# Patient Record
Sex: FEMALE | Race: BLACK OR AFRICAN AMERICAN | NOT HISPANIC OR LATINO
[De-identification: names, ages, dates, MRNs, and addresses within clinical notes are randomized per-mention and may not be internally consistent; named-entity substitution may affect disease eponyms.]

---

## 2023-06-28 PROBLEM — Z00.00 ENCOUNTER FOR PREVENTIVE HEALTH EXAMINATION: Status: ACTIVE | Noted: 2023-06-28

## 2023-07-26 ENCOUNTER — APPOINTMENT (OUTPATIENT)
Dept: PULMONOLOGY | Facility: CLINIC | Age: 28
End: 2023-07-26
Payer: MEDICAID

## 2023-07-26 VITALS
BODY MASS INDEX: 36.52 KG/M2 | HEART RATE: 108 BPM | SYSTOLIC BLOOD PRESSURE: 115 MMHG | WEIGHT: 186 LBS | OXYGEN SATURATION: 97 % | DIASTOLIC BLOOD PRESSURE: 79 MMHG | HEIGHT: 60 IN

## 2023-07-26 DIAGNOSIS — I10 ESSENTIAL (PRIMARY) HYPERTENSION: ICD-10-CM

## 2023-07-26 DIAGNOSIS — Z83.3 FAMILY HISTORY OF DIABETES MELLITUS: ICD-10-CM

## 2023-07-26 DIAGNOSIS — E66.9 OBESITY, UNSPECIFIED: ICD-10-CM

## 2023-07-26 DIAGNOSIS — Z78.9 OTHER SPECIFIED HEALTH STATUS: ICD-10-CM

## 2023-07-26 DIAGNOSIS — Z86.39 PERSONAL HISTORY OF OTHER ENDOCRINE, NUTRITIONAL AND METABOLIC DISEASE: ICD-10-CM

## 2023-07-26 DIAGNOSIS — E11.9 TYPE 2 DIABETES MELLITUS W/OUT COMPLICATIONS: ICD-10-CM

## 2023-07-26 DIAGNOSIS — Z82.3 FAMILY HISTORY OF STROKE: ICD-10-CM

## 2023-07-26 DIAGNOSIS — R06.02 SHORTNESS OF BREATH: ICD-10-CM

## 2023-07-26 DIAGNOSIS — Z01.811 ENCOUNTER FOR PREPROCEDURAL RESPIRATORY EXAMINATION: ICD-10-CM

## 2023-07-26 DIAGNOSIS — R06.83 SNORING: ICD-10-CM

## 2023-07-26 PROCEDURE — 99203 OFFICE O/P NEW LOW 30 MIN: CPT

## 2023-07-26 NOTE — PHYSICAL EXAM
MEDICATIONS  (STANDING):  aspirin  chewable 81 milliGRAM(s) Oral daily  famotidine    Tablet 20 milliGRAM(s) Oral daily  fluticasone furoate/umeclidinium/vilanterol 100-62.5-25 MICROgram(s) Inhaler 1 Puff(s) Inhalation daily  latanoprost 0.005% Ophthalmic Solution 1 Drop(s) Both EYES at bedtime  metoprolol succinate ER 25 milliGRAM(s) Oral daily  tamsulosin 0.4 milliGRAM(s) Oral at bedtime    MEDICATIONS  (PRN):  acetaminophen     Tablet .. 650 milliGRAM(s) Oral every 6 hours PRN Temp greater or equal to 38C (100.4F), Mild Pain (1 - 3)  albuterol    90 MICROgram(s) HFA Inhaler 2 Puff(s) Inhalation every 6 hours PRN Shortness of Breath and/or Wheezing  ondansetron Injectable 4 milliGRAM(s) IV Push every 6 hours PRN Nausea  oxyCODONE    IR 5 milliGRAM(s) Oral every 6 hours PRN Severe Pain (7 - 10)   [No Acute Distress] : no acute distress MEDICATIONS  (STANDING):  aspirin  chewable 81 milliGRAM(s) Oral daily  famotidine    Tablet 20 milliGRAM(s) Oral daily  fluticasone furoate/umeclidinium/vilanterol 100-62.5-25 MICROgram(s) Inhaler 1 Puff(s) Inhalation daily  latanoprost 0.005% Ophthalmic Solution 1 Drop(s) Both EYES at bedtime  metoprolol succinate ER 25 milliGRAM(s) Oral daily  tamsulosin 0.4 milliGRAM(s) Oral at bedtime    MEDICATIONS  (PRN):  acetaminophen     Tablet .. 650 milliGRAM(s) Oral every 6 hours PRN Temp greater or equal to 38C (100.4F), Mild Pain (1 - 3)  albuterol    90 MICROgram(s) HFA Inhaler 2 Puff(s) Inhalation every 6 hours PRN Shortness of Breath and/or Wheezing  ondansetron Injectable 4 milliGRAM(s) IV Push every 6 hours PRN Nausea  oxyCODONE    IR 5 milliGRAM(s) Oral every 6 hours PRN Severe Pain (7 - 10) [Normal Oropharynx] : normal oropharynx [Normal Appearance] : normal appearance [No Neck Mass] : no neck mass [Normal Rate/Rhythm] : normal rate/rhythm [Normal S1, S2] : normal s1, s2 [No Murmurs] : no murmurs [No Resp Distress] : no resp distress [Clear to Auscultation Bilaterally] : clear to auscultation bilaterally [No Abnormalities] : no abnormalities [Benign] : benign [Normal Gait] : normal gait [No Clubbing] : no clubbing [No Cyanosis] : no cyanosis [No Edema] : no edema [FROM] : FROM [Normal Color/ Pigmentation] : normal color/ pigmentation [No Focal Deficits] : no focal deficits [Oriented x3] : oriented x3 [Normal Affect] : normal affect

## 2023-07-26 NOTE — ASSESSMENT
[FreeTextEntry1] : #SOB\par #Snoring\par #preop pulmonary\par \par SOB could be multifocal due to weight gain, deconditioning, untreated JONELLE, cardiac, pulmonary but not limited too.  Will further evaluate with PFT and home sleep study.  \par \par I discussed the short and long term health effect of the obstructive seep apnea with the patient. These effects include, but not limited to, uncontrolled hypertension, CAD, arrhythmias, sudden death, CVA, and pulmonary hypertension. I advised the patient to avoid sedatives, narcotics, driving, and sleeping pills in the meantime. I discussed the therapeutic options including but not limited to CPAP, surgery, and oral appliance. Further recommendations will follow after sleep study.\par \par MADDIE ROSE  is optimized for surgery. she  is to be extubated once fully awake and able to protect airway.  The patient is to be monitored in the recovery room. They might benefit for high flow oxygen or noninvasive ventilation to prevent or reverse atelectasis.  Patient is to be admitted to a monitored bed postoperatively.  Avoid oversedation.  MADDIE is high risk for DVT and will require bimodal agents for DVT prophylaxis early mobilization is recommended. she is to use the incentive spirometry postoperative. \par \par Follow on home sleep study and PFT.\par \par \par

## 2023-07-26 NOTE — END OF VISIT
[Time Spent: ___ minutes] : I have spent [unfilled] minutes of time on the encounter. [>50% of the face to face encounter time was spent on counseling and/or coordination of care for ___] : Greater than 50% of the face to face encounter time was spent on counseling and/or coordination of care for [unfilled] [FreeTextEntry3] : Pt seen with DAVID Costa and and agree on the above plan of care.  follow on PFT and sleep study No indication fr CXR

## 2023-07-26 NOTE — HISTORY OF PRESENT ILLNESS
[Awakes Unrefreshed] : awakes unrefreshed [Awakes with Dry Mouth] : awakes with dry mouth [Daytime Somnolence] : daytime somnolence [Difficulty Initiating Sleep] : difficulty initiating sleep [Difficulty Maintaining Sleep] : difficulty maintaining sleep [Fatigue] : fatigue [Frequent Nocturnal Awakening] : frequent nocturnal awakening [Nonrestorative Sleep] : nonrestorative sleep [Recent  Weight Gain] : recent weight gain [Snoring] : snoring [Never] : never [TextBox_4] : 27 yr old with PMH PCOS, DM, HTN. Presents today pulmonary clearance.\par \par She present for for bariatric clearance for Dr. Ko Cohen.  She is snoring at night, breathing through her mouth, waking up SOB and has a lot of GERD.  She is has trouble sleeping.  She has have her period for a month heavy bleeding.  Her last HGA1C 10. She is able to walk 3 - 4 miles without SOB.  She has coughing and  has GERD.   Denies wheezing or asthma in the family.  [Awakes with Headache] : does not awaken with headache [ESS] : 10

## 2023-07-27 ENCOUNTER — APPOINTMENT (OUTPATIENT)
Dept: PULMONOLOGY | Facility: CLINIC | Age: 28
End: 2023-07-27
Payer: MEDICAID

## 2023-07-27 PROCEDURE — 94729 DIFFUSING CAPACITY: CPT

## 2023-07-27 PROCEDURE — 94727 GAS DIL/WSHOT DETER LNG VOL: CPT

## 2023-07-27 PROCEDURE — 94060 EVALUATION OF WHEEZING: CPT

## 2023-08-01 ENCOUNTER — EMERGENCY (EMERGENCY)
Facility: HOSPITAL | Age: 28
LOS: 1 days | Discharge: LEFT BEFORE DISCHARGE | End: 2023-08-01
Attending: STUDENT IN AN ORGANIZED HEALTH CARE EDUCATION/TRAINING PROGRAM | Admitting: EMERGENCY MEDICINE
Payer: MEDICAID

## 2023-08-01 VITALS
OXYGEN SATURATION: 98 % | SYSTOLIC BLOOD PRESSURE: 124 MMHG | HEART RATE: 89 BPM | DIASTOLIC BLOOD PRESSURE: 82 MMHG | TEMPERATURE: 98 F | RESPIRATION RATE: 18 BRPM

## 2023-08-01 LAB
ALBUMIN SERPL ELPH-MCNC: 4.5 G/DL — SIGNIFICANT CHANGE UP (ref 3.3–5)
ALBUMIN SERPL ELPH-MCNC: 5 G/DL — SIGNIFICANT CHANGE UP (ref 3.3–5)
ALP SERPL-CCNC: 109 U/L — SIGNIFICANT CHANGE UP (ref 40–120)
ALP SERPL-CCNC: 91 U/L — SIGNIFICANT CHANGE UP (ref 40–120)
ALT FLD-CCNC: 64 U/L — HIGH (ref 4–33)
ALT FLD-CCNC: 77 U/L — HIGH (ref 4–33)
ANION GAP SERPL CALC-SCNC: 14 MMOL/L — SIGNIFICANT CHANGE UP (ref 7–14)
ANION GAP SERPL CALC-SCNC: 16 MMOL/L — HIGH (ref 7–14)
ANION GAP SERPL CALC-SCNC: 17 MMOL/L — HIGH (ref 7–14)
ANION GAP SERPL CALC-SCNC: 20 MMOL/L — HIGH (ref 7–14)
APPEARANCE UR: ABNORMAL
APTT BLD: 23.9 SEC — LOW (ref 24.5–35.6)
AST SERPL-CCNC: 79 U/L — HIGH (ref 4–32)
AST SERPL-CCNC: 83 U/L — HIGH (ref 4–32)
B-OH-BUTYR SERPL-SCNC: 2.9 MMOL/L — HIGH (ref 0–0.4)
B-OH-BUTYR SERPL-SCNC: 3.7 MMOL/L — HIGH (ref 0–0.4)
BACTERIA # UR AUTO: ABNORMAL /HPF
BASE EXCESS BLDV CALC-SCNC: -10.2 MMOL/L — LOW (ref -2–3)
BASE EXCESS BLDV CALC-SCNC: -8.2 MMOL/L — LOW (ref -2–3)
BASE EXCESS BLDV CALC-SCNC: -8.7 MMOL/L — LOW (ref -2–3)
BASOPHILS # BLD AUTO: 0.07 K/UL — SIGNIFICANT CHANGE UP (ref 0–0.2)
BASOPHILS NFR BLD AUTO: 0.8 % — SIGNIFICANT CHANGE UP (ref 0–2)
BILIRUB SERPL-MCNC: 0.3 MG/DL — SIGNIFICANT CHANGE UP (ref 0.2–1.2)
BILIRUB SERPL-MCNC: 0.5 MG/DL — SIGNIFICANT CHANGE UP (ref 0.2–1.2)
BILIRUB UR-MCNC: NEGATIVE — SIGNIFICANT CHANGE UP
BLD GP AB SCN SERPL QL: NEGATIVE — SIGNIFICANT CHANGE UP
BLOOD GAS VENOUS COMPREHENSIVE RESULT: SIGNIFICANT CHANGE UP
BUN SERPL-MCNC: 5 MG/DL — LOW (ref 7–23)
BUN SERPL-MCNC: 5 MG/DL — LOW (ref 7–23)
BUN SERPL-MCNC: 6 MG/DL — LOW (ref 7–23)
BUN SERPL-MCNC: 7 MG/DL — SIGNIFICANT CHANGE UP (ref 7–23)
CALCIUM SERPL-MCNC: 7.9 MG/DL — LOW (ref 8.4–10.5)
CALCIUM SERPL-MCNC: 8.8 MG/DL — SIGNIFICANT CHANGE UP (ref 8.4–10.5)
CALCIUM SERPL-MCNC: 8.9 MG/DL — SIGNIFICANT CHANGE UP (ref 8.4–10.5)
CALCIUM SERPL-MCNC: 9.8 MG/DL — SIGNIFICANT CHANGE UP (ref 8.4–10.5)
CAST: 0 /LPF — SIGNIFICANT CHANGE UP (ref 0–4)
CHLORIDE BLDV-SCNC: 101 MMOL/L — SIGNIFICANT CHANGE UP (ref 96–108)
CHLORIDE BLDV-SCNC: 102 MMOL/L — SIGNIFICANT CHANGE UP (ref 96–108)
CHLORIDE BLDV-SCNC: 104 MMOL/L — SIGNIFICANT CHANGE UP (ref 96–108)
CHLORIDE SERPL-SCNC: 101 MMOL/L — SIGNIFICANT CHANGE UP (ref 98–107)
CHLORIDE SERPL-SCNC: 101 MMOL/L — SIGNIFICANT CHANGE UP (ref 98–107)
CHLORIDE SERPL-SCNC: 105 MMOL/L — SIGNIFICANT CHANGE UP (ref 98–107)
CHLORIDE SERPL-SCNC: 96 MMOL/L — LOW (ref 98–107)
CO2 BLDV-SCNC: 19 MMOL/L — LOW (ref 22–26)
CO2 BLDV-SCNC: 19.7 MMOL/L — LOW (ref 22–26)
CO2 BLDV-SCNC: 20.7 MMOL/L — LOW (ref 22–26)
CO2 SERPL-SCNC: 16 MMOL/L — LOW (ref 22–31)
CO2 SERPL-SCNC: 17 MMOL/L — LOW (ref 22–31)
CO2 SERPL-SCNC: 17 MMOL/L — LOW (ref 22–31)
CO2 SERPL-SCNC: 18 MMOL/L — LOW (ref 22–31)
COLOR SPEC: ABNORMAL
CREAT SERPL-MCNC: 0.64 MG/DL — SIGNIFICANT CHANGE UP (ref 0.5–1.3)
CREAT SERPL-MCNC: 0.68 MG/DL — SIGNIFICANT CHANGE UP (ref 0.5–1.3)
CREAT SERPL-MCNC: 0.73 MG/DL — SIGNIFICANT CHANGE UP (ref 0.5–1.3)
CREAT SERPL-MCNC: 0.82 MG/DL — SIGNIFICANT CHANGE UP (ref 0.5–1.3)
DIFF PNL FLD: ABNORMAL
EGFR: 102 ML/MIN/1.73M2 — SIGNIFICANT CHANGE UP
EGFR: 117 ML/MIN/1.73M2 — SIGNIFICANT CHANGE UP
EGFR: 124 ML/MIN/1.73M2 — SIGNIFICANT CHANGE UP
EGFR: 126 ML/MIN/1.73M2 — SIGNIFICANT CHANGE UP
EOSINOPHIL # BLD AUTO: 0.11 K/UL — SIGNIFICANT CHANGE UP (ref 0–0.5)
EOSINOPHIL NFR BLD AUTO: 1.3 % — SIGNIFICANT CHANGE UP (ref 0–6)
GAS PNL BLDV: 133 MMOL/L — LOW (ref 136–145)
GAS PNL BLDV: 134 MMOL/L — LOW (ref 136–145)
GAS PNL BLDV: 134 MMOL/L — LOW (ref 136–145)
GAS PNL BLDV: SIGNIFICANT CHANGE UP
GLUCOSE BLDV-MCNC: 210 MG/DL — HIGH (ref 70–99)
GLUCOSE BLDV-MCNC: 275 MG/DL — HIGH (ref 70–99)
GLUCOSE BLDV-MCNC: 281 MG/DL — HIGH (ref 70–99)
GLUCOSE SERPL-MCNC: 201 MG/DL — HIGH (ref 70–99)
GLUCOSE SERPL-MCNC: 263 MG/DL — HIGH (ref 70–99)
GLUCOSE SERPL-MCNC: 264 MG/DL — HIGH (ref 70–99)
GLUCOSE SERPL-MCNC: 359 MG/DL — HIGH (ref 70–99)
GLUCOSE UR QL: >=1000 MG/DL
HCO3 BLDV-SCNC: 18 MMOL/L — LOW (ref 22–29)
HCO3 BLDV-SCNC: 18 MMOL/L — LOW (ref 22–29)
HCO3 BLDV-SCNC: 19 MMOL/L — LOW (ref 22–29)
HCT VFR BLD CALC: 45.7 % — HIGH (ref 34.5–45)
HCT VFR BLDA CALC: 40 % — SIGNIFICANT CHANGE UP (ref 34.5–46.5)
HCT VFR BLDA CALC: 42 % — SIGNIFICANT CHANGE UP (ref 34.5–46.5)
HCT VFR BLDA CALC: 42 % — SIGNIFICANT CHANGE UP (ref 34.5–46.5)
HGB BLD CALC-MCNC: 13.4 G/DL — SIGNIFICANT CHANGE UP (ref 11.7–16.1)
HGB BLD CALC-MCNC: 14 G/DL — SIGNIFICANT CHANGE UP (ref 11.7–16.1)
HGB BLD CALC-MCNC: 14 G/DL — SIGNIFICANT CHANGE UP (ref 11.7–16.1)
HGB BLD-MCNC: 15.7 G/DL — HIGH (ref 11.5–15.5)
IANC: 5.55 K/UL — SIGNIFICANT CHANGE UP (ref 1.8–7.4)
IMM GRANULOCYTES NFR BLD AUTO: 0.6 % — SIGNIFICANT CHANGE UP (ref 0–0.9)
INR BLD: 0.94 RATIO — SIGNIFICANT CHANGE UP (ref 0.85–1.18)
KETONES UR-MCNC: >=160 MG/DL
LACTATE BLDV-MCNC: 1.2 MMOL/L — SIGNIFICANT CHANGE UP (ref 0.5–2)
LACTATE BLDV-MCNC: 1.3 MMOL/L — SIGNIFICANT CHANGE UP (ref 0.5–2)
LACTATE BLDV-MCNC: 1.5 MMOL/L — SIGNIFICANT CHANGE UP (ref 0.5–2)
LEUKOCYTE ESTERASE UR-ACNC: NEGATIVE — SIGNIFICANT CHANGE UP
LYMPHOCYTES # BLD AUTO: 2.4 K/UL — SIGNIFICANT CHANGE UP (ref 1–3.3)
LYMPHOCYTES # BLD AUTO: 27.7 % — SIGNIFICANT CHANGE UP (ref 13–44)
MCHC RBC-ENTMCNC: 29.1 PG — SIGNIFICANT CHANGE UP (ref 27–34)
MCHC RBC-ENTMCNC: 34.4 GM/DL — SIGNIFICANT CHANGE UP (ref 32–36)
MCV RBC AUTO: 84.8 FL — SIGNIFICANT CHANGE UP (ref 80–100)
MONOCYTES # BLD AUTO: 0.48 K/UL — SIGNIFICANT CHANGE UP (ref 0–0.9)
MONOCYTES NFR BLD AUTO: 5.5 % — SIGNIFICANT CHANGE UP (ref 2–14)
NEUTROPHILS # BLD AUTO: 5.55 K/UL — SIGNIFICANT CHANGE UP (ref 1.8–7.4)
NEUTROPHILS NFR BLD AUTO: 64.1 % — SIGNIFICANT CHANGE UP (ref 43–77)
NITRITE UR-MCNC: NEGATIVE — SIGNIFICANT CHANGE UP
NRBC # BLD: 0 /100 WBCS — SIGNIFICANT CHANGE UP (ref 0–0)
NRBC # FLD: 0 K/UL — SIGNIFICANT CHANGE UP (ref 0–0)
PCO2 BLDV: 43 MMHG — SIGNIFICANT CHANGE UP (ref 39–52)
PCO2 BLDV: 45 MMHG — SIGNIFICANT CHANGE UP (ref 39–52)
PCO2 BLDV: 46 MMHG — SIGNIFICANT CHANGE UP (ref 39–52)
PH BLDV: 7.2 — LOW (ref 7.32–7.43)
PH BLDV: 7.23 — LOW (ref 7.32–7.43)
PH BLDV: 7.24 — LOW (ref 7.32–7.43)
PH UR: 5.5 — SIGNIFICANT CHANGE UP (ref 5–8)
PLATELET # BLD AUTO: 254 K/UL — SIGNIFICANT CHANGE UP (ref 150–400)
PO2 BLDV: 33 MMHG — SIGNIFICANT CHANGE UP (ref 25–45)
PO2 BLDV: 38 MMHG — SIGNIFICANT CHANGE UP (ref 25–45)
PO2 BLDV: 38 MMHG — SIGNIFICANT CHANGE UP (ref 25–45)
POTASSIUM BLDV-SCNC: 3.8 MMOL/L — SIGNIFICANT CHANGE UP (ref 3.5–5.1)
POTASSIUM BLDV-SCNC: 4 MMOL/L — SIGNIFICANT CHANGE UP (ref 3.5–5.1)
POTASSIUM BLDV-SCNC: 4.2 MMOL/L — SIGNIFICANT CHANGE UP (ref 3.5–5.1)
POTASSIUM SERPL-MCNC: 3.8 MMOL/L — SIGNIFICANT CHANGE UP (ref 3.5–5.3)
POTASSIUM SERPL-MCNC: 3.9 MMOL/L — SIGNIFICANT CHANGE UP (ref 3.5–5.3)
POTASSIUM SERPL-MCNC: 4.1 MMOL/L — SIGNIFICANT CHANGE UP (ref 3.5–5.3)
POTASSIUM SERPL-MCNC: 4.6 MMOL/L — SIGNIFICANT CHANGE UP (ref 3.5–5.3)
POTASSIUM SERPL-SCNC: 3.8 MMOL/L — SIGNIFICANT CHANGE UP (ref 3.5–5.3)
POTASSIUM SERPL-SCNC: 3.9 MMOL/L — SIGNIFICANT CHANGE UP (ref 3.5–5.3)
POTASSIUM SERPL-SCNC: 4.1 MMOL/L — SIGNIFICANT CHANGE UP (ref 3.5–5.3)
POTASSIUM SERPL-SCNC: 4.6 MMOL/L — SIGNIFICANT CHANGE UP (ref 3.5–5.3)
PROT SERPL-MCNC: 7.5 G/DL — SIGNIFICANT CHANGE UP (ref 6–8.3)
PROT SERPL-MCNC: 8.8 G/DL — HIGH (ref 6–8.3)
PROT UR-MCNC: 100 MG/DL
PROTHROM AB SERPL-ACNC: 10.6 SEC — SIGNIFICANT CHANGE UP (ref 9.5–13)
RBC # BLD: 5.39 M/UL — HIGH (ref 3.8–5.2)
RBC # FLD: 12.8 % — SIGNIFICANT CHANGE UP (ref 10.3–14.5)
RBC CASTS # UR COMP ASSIST: 9 /HPF — HIGH (ref 0–4)
REVIEW: SIGNIFICANT CHANGE UP
RH IG SCN BLD-IMP: POSITIVE — SIGNIFICANT CHANGE UP
SAO2 % BLDV: 51.9 % — LOW (ref 67–88)
SAO2 % BLDV: 56.7 % — LOW (ref 67–88)
SAO2 % BLDV: 64.8 % — LOW (ref 67–88)
SODIUM SERPL-SCNC: 133 MMOL/L — LOW (ref 135–145)
SODIUM SERPL-SCNC: 135 MMOL/L — SIGNIFICANT CHANGE UP (ref 135–145)
SP GR SPEC: 1.04 — HIGH (ref 1–1.03)
SQUAMOUS # UR AUTO: 24 /HPF — HIGH (ref 0–5)
UROBILINOGEN FLD QL: 1 MG/DL — SIGNIFICANT CHANGE UP (ref 0.2–1)
WBC # BLD: 8.66 K/UL — SIGNIFICANT CHANGE UP (ref 3.8–10.5)
WBC # FLD AUTO: 8.66 K/UL — SIGNIFICANT CHANGE UP (ref 3.8–10.5)
WBC UR QL: SIGNIFICANT CHANGE UP /HPF (ref 0–5)

## 2023-08-01 PROCEDURE — 76830 TRANSVAGINAL US NON-OB: CPT | Mod: 26

## 2023-08-01 PROCEDURE — 93010 ELECTROCARDIOGRAM REPORT: CPT

## 2023-08-01 PROCEDURE — 76705 ECHO EXAM OF ABDOMEN: CPT | Mod: 26

## 2023-08-01 PROCEDURE — 99285 EMERGENCY DEPT VISIT HI MDM: CPT

## 2023-08-01 RX ORDER — INSULIN LISPRO 100/ML
5 VIAL (ML) SUBCUTANEOUS ONCE
Refills: 0 | Status: COMPLETED | OUTPATIENT
Start: 2023-08-01 | End: 2023-08-01

## 2023-08-01 RX ORDER — INSULIN GLARGINE 100 [IU]/ML
15 INJECTION, SOLUTION SUBCUTANEOUS ONCE
Refills: 0 | Status: COMPLETED | OUTPATIENT
Start: 2023-08-01 | End: 2023-08-01

## 2023-08-01 RX ORDER — SODIUM CHLORIDE 9 MG/ML
1000 INJECTION INTRAMUSCULAR; INTRAVENOUS; SUBCUTANEOUS
Refills: 0 | Status: DISCONTINUED | OUTPATIENT
Start: 2023-08-01 | End: 2023-08-04

## 2023-08-01 RX ORDER — SODIUM CHLORIDE 9 MG/ML
1000 INJECTION INTRAMUSCULAR; INTRAVENOUS; SUBCUTANEOUS ONCE
Refills: 0 | Status: COMPLETED | OUTPATIENT
Start: 2023-08-01 | End: 2023-08-01

## 2023-08-01 RX ORDER — SODIUM CHLORIDE 9 MG/ML
1000 INJECTION, SOLUTION INTRAVENOUS ONCE
Refills: 0 | Status: COMPLETED | OUTPATIENT
Start: 2023-08-01 | End: 2023-08-01

## 2023-08-01 RX ADMIN — Medication 5 UNIT(S): at 18:19

## 2023-08-01 RX ADMIN — INSULIN GLARGINE 15 UNIT(S): 100 INJECTION, SOLUTION SUBCUTANEOUS at 18:18

## 2023-08-01 RX ADMIN — SODIUM CHLORIDE 1000 MILLILITER(S): 9 INJECTION INTRAMUSCULAR; INTRAVENOUS; SUBCUTANEOUS at 13:02

## 2023-08-01 RX ADMIN — SODIUM CHLORIDE 1000 MILLILITER(S): 9 INJECTION, SOLUTION INTRAVENOUS at 16:10

## 2023-08-01 RX ADMIN — SODIUM CHLORIDE 1000 MILLILITER(S): 9 INJECTION INTRAMUSCULAR; INTRAVENOUS; SUBCUTANEOUS at 21:51

## 2023-08-01 NOTE — ED ADULT TRIAGE NOTE - CHIEF COMPLAINT QUOTE
Pt presents to ED ambulatory from urgent care with c/o vaginal bleeding x 1month. Pt was seen at urgent care and advised to come to ED for further eval. Pt endorses abdominal cramping, and lightheadedness.

## 2023-08-01 NOTE — ED ADULT NURSE NOTE - OBJECTIVE STATEMENT
Patient presents to ED from [facility] urgent care c/o vaginal bleed x1 month. She is AA&Ox4, in no acute distress, calm, cooperative. Denies CP, SOB, dyspnea, N/V, fevers. Respirations even, unlabored on room air. No significant past medical history. 20G IV placed right AC, labs drawn and sent.

## 2023-08-01 NOTE — ED ADULT NURSE NOTE - NSFALLUNIVINTERV_ED_ALL_ED
Bed/Stretcher in lowest position, wheels locked, appropriate side rails in place/Call bell, personal items and telephone in reach/Instruct patient to call for assistance before getting out of bed/chair/stretcher/Non-slip footwear applied when patient is off stretcher/[place] to call system/Physically safe environment - no spills, clutter or unnecessary equipment/Purposeful proactive rounding/Room/bathroom lighting operational, light cord in reach

## 2023-08-01 NOTE — ED PROVIDER NOTE - ATTENDING CONTRIBUTION TO CARE
27-year-old female past med history PCOS sent in by urgent care after presented for vaginal bleeding for the past 30 days, lightheadedness, intermittent blurry vision.  Patient reports multiple pads daily.  She denies palpitations, shortness of breath, abdominal pain, nausea, vomiting.  Does have irregular periods however does not typically not have prolonged bleeding.  Does report to dysuria and increased frequency.  She denies fever, chills.  Blurry vision described as intermittent floaters, no abnormal vision at time of ED evaluation.  Exam as above.  Plan labs, ultrasound, IV fluids, reassess.

## 2023-08-01 NOTE — ED PROVIDER NOTE - CLINICAL SUMMARY MEDICAL DECISION MAKING FREE TEXT BOX
28 y/o F  w PMHx of PCOS, sent in by  c/o bleeding for the last 30 days with associated lightheadedness and blurry vision. LMP 26 y/o F  w PMHx of PCOS, sent in by  c/o bleeding for the last 30 days with associated lightheadedness and blurry vision. LMP in february with period usually 7days long. not sexually active. Soaking ~10pads a day. VSS. Clinically stable. Clinical suspicion for fibroids vs. polyps, not as high suspicion for ectopic due to no acute abdominal pain, denial of sexual activity, and duration of symptoms, but will assess with transvaginal ultrasound.     Disposition pending ultrasound, labs to confirm hemoglobin status.

## 2023-08-01 NOTE — ED PROVIDER NOTE - BIRTH SEX
Female Suturegard Intro: Intraoperative tissue expansion was performed, utilizing the SUTUREGARD device, in order to reduce wound tension.

## 2023-08-01 NOTE — ED PROVIDER NOTE - PROGRESS NOTE DETAILS
KEVIN Parra:  Received sign out.  Pt was noted to be in DKA.  fluids and insulin were already given and repeat labs showing improvement.  will send to cdu for endocrine, diabetes management.

## 2023-08-01 NOTE — ED PROVIDER NOTE - NS ED ROS FT
GENERAL: Denies weight loss, fever and chills.  EYES: admits to blurry vision,   HEENT: denies trouble swallowing or speaking,   CARDIAC: denies chest pain or palpitations   PULMONARY: denies cough or SOB,  GI: admits to abdominal pain, no nausea, no vomiting, no diarrhea or constipation,   : admits to dysuria and urinary frequency and hematuria, unclear if from vaginal bleed   SKIN: denies rashes,   NEURO: admits to headache, dizziness  MSK: Denies myalgia and joint pain.  PSYCHIATRIC: Denies recent changes in mood. Denies anxiety and depression.    All other ROS negative unless otherwise specified in HPI.

## 2023-08-01 NOTE — ED PROVIDER NOTE - PHYSICAL EXAMINATION
Gen: AAOx3, non-toxic  Head: NCAT  HEENT: EOMI, oral mucosa moist, normal conjunctiva  Lung: CTAB, no respiratory distress, no wheezes/rhonchi/rales B/L,   CV: RRR, no murmurs, rubs or gallops  Abd: soft, mild LUQ tenderness to palpation with guarding, no CVA tenderness  MSK: no visible deformities  Neuro: No focal sensory or motor deficits  Skin: Warm, well perfused, no rash  Psych: normal affect.   Pelvic: Normal anterograde uterus. Ovaries palpable without masses.

## 2023-08-02 VITALS
HEART RATE: 83 BPM | DIASTOLIC BLOOD PRESSURE: 63 MMHG | SYSTOLIC BLOOD PRESSURE: 113 MMHG | TEMPERATURE: 98 F | OXYGEN SATURATION: 100 % | RESPIRATION RATE: 16 BRPM

## 2023-08-02 DIAGNOSIS — E78.5 HYPERLIPIDEMIA, UNSPECIFIED: ICD-10-CM

## 2023-08-02 DIAGNOSIS — E66.01 MORBID (SEVERE) OBESITY DUE TO EXCESS CALORIES: ICD-10-CM

## 2023-08-02 DIAGNOSIS — E10.9 TYPE 1 DIABETES MELLITUS WITHOUT COMPLICATIONS: ICD-10-CM

## 2023-08-02 LAB
ALBUMIN SERPL ELPH-MCNC: 3.8 G/DL — SIGNIFICANT CHANGE UP (ref 3.3–5)
ALP SERPL-CCNC: 79 U/L — SIGNIFICANT CHANGE UP (ref 40–120)
ALT FLD-CCNC: 61 U/L — HIGH (ref 4–33)
ANION GAP SERPL CALC-SCNC: 15 MMOL/L — HIGH (ref 7–14)
AST SERPL-CCNC: 96 U/L — HIGH (ref 4–32)
BASE EXCESS BLDV CALC-SCNC: -9.6 MMOL/L — LOW (ref -2–3)
BASE EXCESS BLDV CALC-SCNC: -9.8 MMOL/L — LOW (ref -2–3)
BILIRUB SERPL-MCNC: 0.5 MG/DL — SIGNIFICANT CHANGE UP (ref 0.2–1.2)
BLOOD GAS VENOUS COMPREHENSIVE RESULT: SIGNIFICANT CHANGE UP
BLOOD GAS VENOUS COMPREHENSIVE RESULT: SIGNIFICANT CHANGE UP
BUN SERPL-MCNC: 4 MG/DL — LOW (ref 7–23)
CALCIUM SERPL-MCNC: 8.3 MG/DL — LOW (ref 8.4–10.5)
CHLORIDE BLDV-SCNC: 103 MMOL/L — SIGNIFICANT CHANGE UP (ref 96–108)
CHLORIDE BLDV-SCNC: 104 MMOL/L — SIGNIFICANT CHANGE UP (ref 96–108)
CHLORIDE SERPL-SCNC: 104 MMOL/L — SIGNIFICANT CHANGE UP (ref 98–107)
CO2 BLDV-SCNC: 18.9 MMOL/L — LOW (ref 22–26)
CO2 BLDV-SCNC: 18.9 MMOL/L — LOW (ref 22–26)
CO2 SERPL-SCNC: 16 MMOL/L — LOW (ref 22–31)
CREAT SERPL-MCNC: 0.6 MG/DL — SIGNIFICANT CHANGE UP (ref 0.5–1.3)
EGFR: 128 ML/MIN/1.73M2 — SIGNIFICANT CHANGE UP
GAS PNL BLDV: 133 MMOL/L — LOW (ref 136–145)
GAS PNL BLDV: 133 MMOL/L — LOW (ref 136–145)
GAS PNL BLDV: SIGNIFICANT CHANGE UP
GLUCOSE BLDV-MCNC: 266 MG/DL — HIGH (ref 70–99)
GLUCOSE BLDV-MCNC: 351 MG/DL — HIGH (ref 70–99)
GLUCOSE SERPL-MCNC: 264 MG/DL — HIGH (ref 70–99)
HCO3 BLDV-SCNC: 18 MMOL/L — LOW (ref 22–29)
HCO3 BLDV-SCNC: 18 MMOL/L — LOW (ref 22–29)
HCT VFR BLDA CALC: 40 % — SIGNIFICANT CHANGE UP (ref 34.5–46.5)
HCT VFR BLDA CALC: 42 % — SIGNIFICANT CHANGE UP (ref 34.5–46.5)
HGB BLD CALC-MCNC: 13.2 G/DL — SIGNIFICANT CHANGE UP (ref 11.7–16.1)
HGB BLD CALC-MCNC: 14.1 G/DL — SIGNIFICANT CHANGE UP (ref 11.7–16.1)
LACTATE BLDV-MCNC: 1.2 MMOL/L — SIGNIFICANT CHANGE UP (ref 0.5–2)
LACTATE BLDV-MCNC: 1.3 MMOL/L — SIGNIFICANT CHANGE UP (ref 0.5–2)
MAGNESIUM SERPL-MCNC: 1.7 MG/DL — SIGNIFICANT CHANGE UP (ref 1.6–2.6)
PCO2 BLDV: 42 MMHG — SIGNIFICANT CHANGE UP (ref 39–52)
PCO2 BLDV: 43 MMHG — SIGNIFICANT CHANGE UP (ref 39–52)
PH BLDV: 7.22 — LOW (ref 7.32–7.43)
PH BLDV: 7.23 — LOW (ref 7.32–7.43)
PHOSPHATE SERPL-MCNC: 2.5 MG/DL — SIGNIFICANT CHANGE UP (ref 2.5–4.5)
PO2 BLDV: 45 MMHG — SIGNIFICANT CHANGE UP (ref 25–45)
PO2 BLDV: 55 MMHG — HIGH (ref 25–45)
POTASSIUM BLDV-SCNC: 3.6 MMOL/L — SIGNIFICANT CHANGE UP (ref 3.5–5.1)
POTASSIUM BLDV-SCNC: 4 MMOL/L — SIGNIFICANT CHANGE UP (ref 3.5–5.1)
POTASSIUM SERPL-MCNC: 3.6 MMOL/L — SIGNIFICANT CHANGE UP (ref 3.5–5.3)
POTASSIUM SERPL-SCNC: 3.6 MMOL/L — SIGNIFICANT CHANGE UP (ref 3.5–5.3)
PROT SERPL-MCNC: 6.6 G/DL — SIGNIFICANT CHANGE UP (ref 6–8.3)
SAO2 % BLDV: 74.1 % — SIGNIFICANT CHANGE UP (ref 67–88)
SAO2 % BLDV: 85.6 % — SIGNIFICANT CHANGE UP (ref 67–88)
SODIUM SERPL-SCNC: 135 MMOL/L — SIGNIFICANT CHANGE UP (ref 135–145)

## 2023-08-02 PROCEDURE — 99236 HOSP IP/OBS SAME DATE HI 85: CPT

## 2023-08-02 PROCEDURE — 99284 EMERGENCY DEPT VISIT MOD MDM: CPT | Mod: GC

## 2023-08-02 RX ORDER — DEXTROSE 50 % IN WATER 50 %
25 SYRINGE (ML) INTRAVENOUS ONCE
Refills: 0 | Status: DISCONTINUED | OUTPATIENT
Start: 2023-08-02 | End: 2023-08-04

## 2023-08-02 RX ORDER — DEXTROSE 50 % IN WATER 50 %
12.5 SYRINGE (ML) INTRAVENOUS ONCE
Refills: 0 | Status: DISCONTINUED | OUTPATIENT
Start: 2023-08-02 | End: 2023-08-04

## 2023-08-02 RX ORDER — GLUCAGON INJECTION, SOLUTION 0.5 MG/.1ML
1 INJECTION, SOLUTION SUBCUTANEOUS ONCE
Refills: 0 | Status: DISCONTINUED | OUTPATIENT
Start: 2023-08-02 | End: 2023-08-04

## 2023-08-02 RX ORDER — INSULIN GLARGINE 100 [IU]/ML
15 INJECTION, SOLUTION SUBCUTANEOUS AT BEDTIME
Refills: 0 | Status: DISCONTINUED | OUTPATIENT
Start: 2023-08-02 | End: 2023-08-02

## 2023-08-02 RX ORDER — METFORMIN HYDROCHLORIDE 850 MG/1
1 TABLET ORAL
Qty: 14 | Refills: 0
Start: 2023-08-02 | End: 2023-08-08

## 2023-08-02 RX ORDER — INSULIN LISPRO 100/ML
5 VIAL (ML) SUBCUTANEOUS
Refills: 0 | Status: DISCONTINUED | OUTPATIENT
Start: 2023-08-02 | End: 2023-08-02

## 2023-08-02 RX ORDER — DEXTROSE 50 % IN WATER 50 %
15 SYRINGE (ML) INTRAVENOUS ONCE
Refills: 0 | Status: DISCONTINUED | OUTPATIENT
Start: 2023-08-02 | End: 2023-08-04

## 2023-08-02 RX ORDER — INSULIN GLARGINE 100 [IU]/ML
18 INJECTION, SOLUTION SUBCUTANEOUS
Qty: 2 | Refills: 0
Start: 2023-08-02 | End: 2023-08-31

## 2023-08-02 RX ORDER — METFORMIN HYDROCHLORIDE 850 MG/1
1 TABLET ORAL
Qty: 60 | Refills: 0
Start: 2023-08-02 | End: 2023-08-31

## 2023-08-02 RX ORDER — INSULIN GLARGINE 100 [IU]/ML
18 INJECTION, SOLUTION SUBCUTANEOUS AT BEDTIME
Refills: 0 | Status: DISCONTINUED | OUTPATIENT
Start: 2023-08-02 | End: 2023-08-04

## 2023-08-02 RX ORDER — SODIUM CHLORIDE 9 MG/ML
1000 INJECTION, SOLUTION INTRAVENOUS
Refills: 0 | Status: DISCONTINUED | OUTPATIENT
Start: 2023-08-02 | End: 2023-08-04

## 2023-08-02 RX ORDER — INSULIN LISPRO 100/ML
VIAL (ML) SUBCUTANEOUS
Refills: 0 | Status: DISCONTINUED | OUTPATIENT
Start: 2023-08-02 | End: 2023-08-04

## 2023-08-02 RX ORDER — INSULIN LISPRO 100/ML
VIAL (ML) SUBCUTANEOUS AT BEDTIME
Refills: 0 | Status: DISCONTINUED | OUTPATIENT
Start: 2023-08-02 | End: 2023-08-04

## 2023-08-02 RX ORDER — ACETAMINOPHEN 500 MG
650 TABLET ORAL EVERY 6 HOURS
Refills: 0 | Status: DISCONTINUED | OUTPATIENT
Start: 2023-08-02 | End: 2023-08-04

## 2023-08-02 RX ORDER — INSULIN LISPRO 100/ML
6 VIAL (ML) SUBCUTANEOUS
Refills: 0 | Status: DISCONTINUED | OUTPATIENT
Start: 2023-08-02 | End: 2023-08-04

## 2023-08-02 RX ADMIN — Medication 5 UNIT(S): at 08:57

## 2023-08-02 RX ADMIN — INSULIN GLARGINE 18 UNIT(S): 100 INJECTION, SOLUTION SUBCUTANEOUS at 21:00

## 2023-08-02 RX ADMIN — Medication 3: at 12:23

## 2023-08-02 RX ADMIN — Medication 5 UNIT(S): at 12:22

## 2023-08-02 RX ADMIN — SODIUM CHLORIDE 150 MILLILITER(S): 9 INJECTION INTRAMUSCULAR; INTRAVENOUS; SUBCUTANEOUS at 00:02

## 2023-08-02 RX ADMIN — Medication 1: at 21:01

## 2023-08-02 RX ADMIN — Medication 4: at 08:57

## 2023-08-02 RX ADMIN — Medication 30 MILLILITER(S): at 17:04

## 2023-08-02 RX ADMIN — Medication 3: at 17:04

## 2023-08-02 RX ADMIN — Medication 6 UNIT(S): at 17:05

## 2023-08-02 RX ADMIN — SODIUM CHLORIDE 150 MILLILITER(S): 9 INJECTION INTRAMUSCULAR; INTRAVENOUS; SUBCUTANEOUS at 16:03

## 2023-08-02 NOTE — CONSULT NOTE ADULT - ASSESSMENT
27-year-old female with history of PCOS, not on any daily medications, presented to the ED with complaints of vaginal bleeding for 1 month with associated lightheadedness and blurry vision. Found to be in mild DKA. Endocrinology consulted for glucose management recommendations    1. Diabetes mellitus complicated by mild DKA  -Pt notes carb heavy diet with poor eating habits  -Recently diagnosed DM by PCP on 6/15, not on any medications  -On presentation to ED BHB elevated at 3.7, UA positive for ketones, vBG pH 7.2 on presentation, anion gap 20, glucose 350. Concerning for mild DKA.    -S/p 4L fluid boluses  -S/p lantus 15U and admelog 5U at 6pm    Outpatient recommendations:  ***      2. HLD  -Pt had outpatient lab work 6/16/2023 with , HDL 41, total cholesterol 164, not on any medications  -Consider starting statin   -Recommend lifestyle modifications    3. Obesity   -Diet and lifestyle modifications  27-year-old female with history of PCOS, not on any daily medications, presented to the ED with complaints of vaginal bleeding for 1 month with associated lightheadedness and blurry vision. Found to be in mild DKA. Endocrinology consulted for glucose management recommendations    1. Diabetes mellitus complicated by mild DKA  -Pt notes carb heavy diet with poor eating habits  -Recently diagnosed DM by PCP on 6/15, not on any medications  -On presentation to ED BHB elevated at 3.7, UA positive for ketones, vBG pH 7.2 on presentation, anion gap 20, glucose 350. Concerning for mild DKA.    -S/p 4L fluid boluses  -S/p lantus 15U and admelog 5U at 6pm    Inpatient  -Glucose target 100-180, pt has been elevated past 24 hrs recommendations as below  -C/w low dose correction admelog premeal and at bedtime  -Sent tests for glutamic acid decarboxylase antibodies, zinc transporter 8, islet antigen antibody  -Increase admelog to 6U TID premeal (hold if NPO)  -Start lantus 18U at bedtime  -RD consult  -Pharmacy liaison to see patient for insulin pen teaching  -Prescribe glucometer, test strips, lancets and instruct how to use glucometer    Outpatient recommendations:  -Pt needs follow up with endocrinology   -Can likely be sent on basal insulin + metformin  -May benefit from GLP1 agonist in future      2. HLD  -Pt had outpatient lab work 6/16/2023 with , HDL 41, total cholesterol 164, not on any medications  -Consider starting statin   -Recommend lifestyle modifications      3. Obesity   -RD consult  -Diet and lifestyle modifications  27-year-old female with history of PCOS, not on any daily medications, presented to the ED with complaints of vaginal bleeding for 1 month with associated lightheadedness and blurry vision. Found to be in mild DKA. Endocrinology consulted for glucose management recommendations    1. Diabetes mellitus complicated by mild DKA  -Pt notes carb heavy diet with poor eating habits  -Recently diagnosed DM by PCP on 6/15, not on any medications  -On presentation to ED BHB elevated at 3.7, UA positive for ketones, vBG pH 7.2 on presentation, anion gap 20, glucose 350. Concerning for mild DKA.    -S/p 4L fluid boluses  -S/p lantus 15U and admelog 5U at 6pm    Inpatient  -Glucose target 100-180, pt has been elevated past 24 hrs recommendations as below  -C/w low dose correction admelog premeal and at bedtime  -Sent tests for glutamic acid decarboxylase antibodies, zinc transporter 8, islet antigen antibody  -Increase admelog to 6U TID premeal (hold if NPO)  -Start lantus 18U at bedtime  -RD consult  -Pharmacy liaison to see patient for insulin pen teaching  -Prescribe glucometer, test strips, lancets and instruct how to use glucometer    Outpatient recommendations:  -Pt needs follow up with endocrinology   -Can likely be sent on basal insulin + metformin  -May benefit from GLP1 agonist in future      2. HLD  -Pt had outpatient lab work 6/16/2023 with , HDL 41, total cholesterol 164, not on any medications  -Recommend lifestyle modifications      3. Obesity   -RD consult  -Diet and lifestyle modifications

## 2023-08-02 NOTE — ED CDU PROVIDER DISPOSITION NOTE - NSPTACCESSSVCSAPPTDETAILS_ED_ALL_ED_FT
Urgent Endocrinology follow up for the soonest available appointment for diabetic ketoacidosis, OBGYN follow up for vaginal bleeding.

## 2023-08-02 NOTE — ED CDU PROVIDER DISPOSITION NOTE - CLINICAL COURSE
27-year-old female with history of PCOS, not on any daily medications, presented to the ED with complaints of vaginal bleeding for 1 month with associated lightheadedness and blurry vision.  Patient was sent to the emergency department for further evaluation.  Upon ROS, patient has been complaining of dysuria and increased frequency as well.  Patient found to have elevated serum glucose of 350 and subsequently found to have a gap of 20 bicarb of 17 as well as a pH of 7.2.  Patient was given insulin, fluids and continually reassessed with a gap of 14 and a bicarb of 16 as well as a pH of 7.24.  Patient accepted to CDU for and weight-based insulin regiment, endo c/s, and general monitoring/re-evaluation. Pt still acidotic with an elevated BHB (still in DKA), but states she will not stay any longer. Aware that leaving is a dangerous and potentially fatal decision, pt is alert, clinically sober and displays decision making capacity. Endocrinology advised of pt's intention and provided emergency discharge recommendations, will D/C AMA as per pt request with return precautions, Endocrinology follow up.

## 2023-08-02 NOTE — ED CDU PROVIDER DISPOSITION NOTE - PATIENT PORTAL LINK FT
You can access the FollowMyHealth Patient Portal offered by [facility] by registering at the following website: [email or web address]. By joining [facility]’s FollowMyHealth portal, you will also be able to view your health information using other applications (apps) compatible with our system.

## 2023-08-02 NOTE — CONSULT NOTE ADULT - ATTENDING COMMENTS
Patient is a 27-year-old woman with history of PCOS, history of newly onset type 2 diabetes about 1 month ago, with an A1c level was 7.2% on Kelly 15, 2023.  Patient presenting with vaginal bleeding for 1 month.  Patient found to be in diabetic ketoacidosis, with an anion gap of 20, bicarbonate of 17 as well as a low pH of 7.2 as well and an elevated beta hydroxybutyrate level.  Patient is admitted to the CDU for further management.  A1c was found to be significantly elevated at 10.6%.  While inpatient, patient was started on Lantus 15 units at bedtime and Admelog 5 units 3 times daily with meals.  Glucose was still elevated.  Recommend increasing Lantus to 18 units at bedtime, Admelog 6 units 3 times daily with meals and however patient is not eating.  This is most likely ketosis prone type 2 diabetes versus BECKY, therefore recommend checking type I antibodies including CAROLE, zinc transporter antibody, islet cell antibody and insulin antibodies, and C-peptide level.    I have contacted our pharmacy liaison to provide patient with some education as well as follow-up care coordination.    Not on statin therapy in February given age of 27 years, continue to work on lifestyle modifications.    Regarding obesity, in the future, may benefit from GLP-1 receptor agonist such as Ozempic/Wegovy/Trulicity.  But would recommend to work on diet and lifestyle modification first.

## 2023-08-02 NOTE — ED CDU PROVIDER INITIAL DAY NOTE - ATTENDING APP SHARED VISIT CONTRIBUTION OF CARE
I have evaluated the patient and agree with the documentation and assessment as made by the PA. We have discussed plan of care and work up for the patient.    Exam:    GEN:  Non-toxic appearing, non-distressed, speaking full sentences, non-diaphoretic, AAOx3  HEENT:  NCAT, neck supple, EOMI, PERRLA, sclera anicteric, no conjunctival pallor or injection, no stridor, normal voice, no tonsillar exudate, uvula midline  CV:  regular rhythm and rate, s1/s2 audible, no murmurs, rubs or gallops, peripheral pulses 2+ and symmetric  PULM:  non-labored respirations, lungs clear to auscultation bilaterally, no wheezes, crackles or rales  ABD:  non distended, non-tender, no rebound, no guarding, negative Gaming's sign, bowel sounds normal, no cvat  MSK:  no gross deformity, non-tender extremities and joints, range of motion grossly normal appearing, no extremity edema, extremities warm and well perfused   NEURO:  AAOx3, CN II-XII intact, motor 5/5 in upper and lower extremities bilaterally, sensation grossly intact in extremities and trunk, finger to nose testing wnl, no nystagmus, negative Romberg, no pronator drift, no gait deficit  SKIN:  warm, dry, no rash or vesicles

## 2023-08-02 NOTE — ED CDU PROVIDER INITIAL DAY NOTE - PROGRESS NOTE DETAILS
Received signout on the pt, most recent labs show the pt is still in DKA, BG downtrending but still elevated. Pt states that she has a long awaited trip and is flying tomorrow and will not stay any longer. Advised that she is likely still in DKA and that this is a serous and potentially fatal diagnosis. Antibody testing ordered to cecilleal if pt is DM I or DM II, spoke to endocrinology fellow Dr. Alec Ozuna to inform him of the pt's intention to AMA and for discharge recommendations. Dr. Hu aware of AMA, will D/C AMA with outpatient endocrinology follow up, return precautions.

## 2023-08-02 NOTE — ED CDU PROVIDER DISPOSITION NOTE - ATTENDING CONTRIBUTION TO CARE
I have made the decision to discharge this patient to the CDU as documented in my Provider note I have reviewed the note  written by the CDU Physician Assistant, on that visit day. I have supervised and participated as necessary in the performance of procedures indicated for patient management and was available at all phases of the patient´s visit when needed.    Patient choses to leave AMA despite all attempts to dissuade; d/c with followup,  RTED PRN

## 2023-08-02 NOTE — ED CDU PROVIDER INITIAL DAY NOTE - OBJECTIVE STATEMENT
27-year-old female with history of PCOS, not on any daily medications, presented to the ED with complaints of vaginal bleeding for 1 month with associated lightheadedness and blurry vision.  Patient was sent to the emergency department for further evaluation.  Upon ROS, patient has been complaining of dysuria and increased frequency as well.  Patient found to have elevated serum glucose of 350 and subsequently found to have a gap of 20 bicarb of 17 as well as a pH of 7.2.  Patient was given insulin, fluids and continually reassessed with a gap of 14 and a bicarb of 16 as well as a pH of 7.24.  Patient accepted to CDU for and weight-based insulin regiment, endo c/s, and general monitoring/re-evaluation.

## 2023-08-02 NOTE — PHARMACOTHERAPY INTERVENTION NOTE - COMMENTS
Pt educated on A1c, goal A1c, basal insulin pen administration, hypoglycemia and treatment, metformin (how to take, how to titrate, side effects), healthy plate, exercise, goal BG, basics of using a glucometer, and when to check BG, pt with good return demo and verbalized understanding (of note, pt is afraid of needles and was very scared while getting the actual insulin dose before dinner - she said she has her  to help just in case). Counseled pt on where to inject insulin (abdomen, outer thighs), rotating injection site to prevent lipodystrophy, proper insulin storage, and sharps disposal. Pt encouraged for outpt f/u with medicine and endocrine.

## 2023-08-06 LAB — GAD65 AB SER-MCNC: 0.02 NMOL/L — SIGNIFICANT CHANGE UP

## 2023-08-08 LAB
GAD65 AB SER-MCNC: 0.03 NMOL/L — HIGH
ISLET CELL512 AB SER-SCNC: 0 NMOL/L — SIGNIFICANT CHANGE UP

## 2023-08-12 LAB
ZINC TRANSPORTER 8 AB, RESULT: <15 U/ML — SIGNIFICANT CHANGE UP
ZINC TRANSPORTER 8 AB, RESULT: <15 U/ML — SIGNIFICANT CHANGE UP

## 2024-01-01 NOTE — ED CDU PROVIDER INITIAL DAY NOTE - NS ED MD PROGRESS NOTE PROVIDER NAME FT
How Severe Is Your Rash?: mild Is This A New Presentation, Or A Follow-Up?: Rash 9 KEVIN Whittington

## 2024-05-17 PROBLEM — E28.2 POLYCYSTIC OVARIAN SYNDROME: Chronic | Status: ACTIVE | Noted: 2023-08-02

## 2024-06-04 ENCOUNTER — APPOINTMENT (OUTPATIENT)
Dept: HUMAN REPRODUCTION | Facility: CLINIC | Age: 29
End: 2024-06-04